# Patient Record
Sex: FEMALE | Race: OTHER | Employment: OTHER | URBAN - METROPOLITAN AREA
[De-identification: names, ages, dates, MRNs, and addresses within clinical notes are randomized per-mention and may not be internally consistent; named-entity substitution may affect disease eponyms.]

---

## 2017-07-10 ENCOUNTER — OFFICE VISIT (OUTPATIENT)
Dept: FAMILY MEDICINE CLINIC | Age: 57
End: 2017-07-10

## 2017-07-10 VITALS
RESPIRATION RATE: 18 BRPM | HEART RATE: 65 BPM | DIASTOLIC BLOOD PRESSURE: 73 MMHG | BODY MASS INDEX: 23.95 KG/M2 | SYSTOLIC BLOOD PRESSURE: 110 MMHG | WEIGHT: 158 LBS | HEIGHT: 68 IN | TEMPERATURE: 97.7 F | OXYGEN SATURATION: 97 %

## 2017-07-10 DIAGNOSIS — R23.3 EASY BRUISING: Primary | ICD-10-CM

## 2017-07-10 RX ORDER — ESCITALOPRAM OXALATE 20 MG/1
20 TABLET ORAL DAILY
COMMUNITY

## 2017-07-10 RX ORDER — LEVOTHYROXINE SODIUM 100 UG/1
TABLET ORAL
COMMUNITY

## 2017-07-10 NOTE — PROGRESS NOTES
Subjective  Meet Anders is an 61 y.o. female who presents to [facility] care. She is here transferring from [address] [facility]. Live sin [facility] now. No significant medical history: 3 miscarriages, hypothyroidism (on synthroid). Surg hx: LTCS, tonsillectomy, appendicitis, right knee meniscal tear repair  Soc hx: rides bikes, takes walks, not currently working. She notices that she is bruising easily. She got some bruises while moving boxes about 3 weeks ago. She has been taking some motrin for muscle aches. No blood thinners. She denies gum bleeding, nose bleed, difficulty with clotting after a scrape or cut, no menorrhagia (when she had periods, now postmenopausal). She only noticed bleeding when she started moving and working with boxes. Past Medical History - reviewed:  Past Medical History:   Diagnosis Date    Anxiety     Migraine     Thyroid disease          ROS  CONSTITUTIONAL: no fever  CARDIOVASCULAR: no chest pain      Physical Exam  Visit Vitals    /73    Pulse 65    Temp 97.7 °F (36.5 °C) (Oral)    Resp 18    Ht 5' 8\" (1.727 m)    Wt 158 lb (71.7 kg)    LMP 07/10/2010    SpO2 97%    BMI 24.02 kg/m2       General appearance - alert, well appearing, and in no distress  Chest - clear to auscultation, no wheezes or rhonchi, symmetric air entry  Heart - normal rate, regular rhythm, normal S1, S2, no murmurs  Abdomen - soft, nontender, nondistended  Extremities - no pedal edema  Skin - normal coloration and turgor, one dime sized bruise present near umbilicus, some healed bruises present on arms. Assessment/Plan    ICD-10-CM ICD-9-CM    1. Easy bruising R23.8 782.9      Bruising appears to be age related. One dime sized bruise present on abdomen. Recent CBC in April 2017 by PCP, within normal limits per patient. No red flag symptoms at this time.  Additionally, patient was moving boxes while relocating.  - Continue to monitor symptoms  - Counseled about preventive care: pap, mammogram, colonoscopy    Follow-up Disposition:  Return if symptoms worsen or fail to improve. I have discussed the diagnosis with the patient and the intended plan as seen in the above orders. The patient has received an after-visit summary and questions were answered concerning future plans. I have discussed medication side effects and warnings with the patient as well.       Hilario Fraire MD  Family Medicine Resident

## 2017-07-10 NOTE — MR AVS SNAPSHOT
Visit Information Date & Time Provider Department Dept. Phone Encounter #  
 7/10/2017  8:35 AM Ning Hebert, [address] [facility] [phone] [id number] Follow-up Instructions Return if symptoms worsen or fail to improve. Upcoming Health Maintenance Date Due Hepatitis C Screening 1960 DTaP/Tdap/Td series (1 - Tdap) 4/18/1980 PAP AKA CERVICAL CYTOLOGY 4/18/1980 BREAST CANCER SCRN MAMMOGRAM 4/18/2009 FOBT Q 1 YEAR AGE 50-75 4/18/2009 INFLUENZA AGE 9 TO ADULT 8/1/2017 Allergies as of 7/10/2017  Review Complete On: 7/10/2017 By: Roxanne Minor MD  
  
 Severity Noted Reaction Type Reactions Pcn [Penicillins]  07/10/2017    Hives Current Immunizations  Never Reviewed No immunizations on file. Not reviewed this visit You Were Diagnosed With   
  
 Codes Comments Easy bruising    -  Primary ICD-10-CM: R23.8 ICD-9-CM: 199. 9 Vitals BP Pulse Temp Resp Height(growth percentile) Weight(growth percentile) 110/73 65 97.7 °F (36.5 °C) (Oral) 18 5' 8\" (1.727 m) 158 lb (71.7 kg) LMP SpO2 BMI OB Status Smoking Status 07/10/2010 97% 24.02 kg/m2 Postmenopausal Never Smoker BMI and BSA Data Body Mass Index Body Surface Area 24.02 kg/m 2 1.85 m 2 Preferred Pharmacy Pharmacy Name Phone [facility] [address] [place] [address] [facility]  [facility] [phone] Your Updated Medication List  
  
   
This list is accurate as of: 7/10/17  9:08 AM.  Always use your most recent med list.  
  
  
  
  
 LEXAPRO 20 mg tablet Generic drug:  escitalopram oxalate Take 20 mg by mouth daily. SYNTHROID 100 mcg tablet Generic drug:  levothyroxine Take  by mouth Daily (before breakfast). VALACYCLOVIR PO Take  by mouth. Follow-up Instructions Return if symptoms worsen or fail to improve. Patient Instructions Well Visit, Women 48 to 72: Care Instructions Your Care Instructions Physical exams can help you stay healthy. Your doctor has checked your overall health and may have suggested ways to take good care of yourself. He or she also may have recommended tests. At home, you can help prevent illness with healthy eating, regular exercise, and other steps. Follow-up care is a key part of your treatment and safety. Be sure to make and go to all appointments, and call your doctor if you are having problems. It's also a good idea to know your test results and keep a list of the medicines you take. How can you care for yourself at home? · Reach and stay at a healthy weight. This will lower your risk for many problems, such as obesity, diabetes, heart disease, and high blood pressure. · Get at least 30 minutes of exercise on most days of the week. Walking is a good choice. You also may want to do other activities, such as running, swimming, cycling, or playing tennis or team sports. · Do not smoke. Smoking can make health problems worse. If you need help quitting, talk to your doctor about stop-smoking programs and medicines. These can increase your chances of quitting for good. · Protect your skin from too much sun. When you're outdoors from 10 a.m. to 4 p.m., stay in the shade or cover up with clothing and a hat with a wide brim. Wear sunglasses that block UV rays. Even when it's cloudy, put broad-spectrum sunscreen (SPF 30 or higher) on any exposed skin. · See a dentist one or two times a year for checkups and to have your teeth cleaned. · Wear a seat belt in the car. · Limit alcohol to 1 drink a day. Too much alcohol can cause health problems. Follow your doctor's advice about when to have certain tests. These tests can spot problems early. · Cholesterol.  Your doctor will tell you how often to have this done based on your age, family history, or other things that can increase your risk for heart attack and stroke. · Blood pressure. Have your blood pressure checked during a routine doctor visit. Your doctor will tell you how often to check your blood pressure based on your age, your blood pressure results, and other factors. · Mammogram. Ask your doctor how often you should have a mammogram, which is an X-ray of your breasts. A mammogram can spot breast cancer before it can be felt and when it is easiest to treat. · Pap test and pelvic exam. Ask your doctor how often you should have a Pap test. You may not need to have a Pap test as often as you used to. · Vision. Have your eyes checked every year or two or as often as your doctor suggests. Some experts recommend that you have yearly exams for glaucoma and other age-related eye problems starting at age 48. · Hearing. Tell your doctor if you notice any change in your hearing. You can have tests to find out how well you hear. · Diabetes. Ask your doctor whether you should have tests for diabetes. · Colon cancer. You should begin tests for colon cancer at age 48. You may have one of several tests. Your doctor will tell you how often to have tests based on your age and risk. Risks include whether you already had a precancerous polyp removed from your colon or whether your parents, sisters and brothers, or children have had colon cancer. · Thyroid disease. Talk to your doctor about whether to have your thyroid checked as part of a regular physical exam. Women have an increased chance of a thyroid problem. · Osteoporosis. You should begin tests for bone density at age 72. If you are younger than 72, ask your doctor whether you have factors that may increase your risk for this disease. You may want to have this test before age 72. · Heart attack and stroke risk. At least every 4 to 6 years, you should have your risk for heart attack and stroke assessed.  Your doctor uses factors such as your age, blood pressure, cholesterol, and whether you smoke or have diabetes to show what your risk for a heart attack or stroke is over the next 10 years. When should you call for help? Watch closely for changes in your health, and be sure to contact your doctor if you have any problems or symptoms that concern you. Where can you learn more? Go to http://nely-dylan.info/. Enter C031 in the search box to learn more about \"Well Visit, Women 50 to 72: Care Instructions. \" Current as of: July 19, 2016 Content Version: 11.3 © 6386-5265 [employer]. Care instructions adapted under license by [employer] (which disclaims liability or warranty for this information). If you have questions about a medical condition or this instruction, always ask your healthcare professional. [address] any warranty or liability for your use of this information. Introducing [facility]! Vesna Chen introduces [employer] patient portal. Now you can access parts of your medical record, email your doctor's office, and request medication refills online. 1. In your internet browser, go to [email or web address] 2. Click on the First Time User? Click Here link in the Sign In box. You will see the New Member Sign Up page. 3. Enter your [employer] Access Code exactly as it appears below. You will not need to use this code after youve completed the sign-up process. If you do not sign up before the expiration date, you must request a new code. · [employer] Access Code: [email or web address] Expires: 10/8/2017  9:08 AM 
 
4. Enter the last four digits of your Social Security Number (xxxx) and Date of Birth (mm/dd/yyyy) as indicated and click Submit. You will be taken to the next sign-up page. 5. Create a [employer] ID. This will be your [employer] login ID and cannot be changed, so think of one that is secure and easy to remember. 6. Create a [employer] password. You can change your password at any time. 7. Enter your Password Reset Question and Answer. This can be used at a later time if you forget your password. 8. Enter your e-mail address. You will receive e-mail notification when new information is available in [address] 9. Click Sign Up. You can now view and download portions of your medical record. 10. Click the Download Summary menu link to download a portable copy of your medical information. If you have questions, please visit the Frequently Asked Questions section of the [employer] website. Remember, [employer] is NOT to be used for urgent needs. For medical emergencies, dial 911. Now available from your iPhone and Android! Please provide this summary of care documentation to your next provider. Your primary care clinician is listed as Ning Osullivan. If you have any questions after today's visit, please call [phone].

## 2017-07-10 NOTE — PATIENT INSTRUCTIONS
Well Visit, Women 48 to 72: Care Instructions  Your Care Instructions  Physical exams can help you stay healthy. Your doctor has checked your overall health and may have suggested ways to take good care of yourself. He or she also may have recommended tests. At home, you can help prevent illness with healthy eating, regular exercise, and other steps. Follow-up care is a key part of your treatment and safety. Be sure to make and go to all appointments, and call your doctor if you are having problems. It's also a good idea to know your test results and keep a list of the medicines you take. How can you care for yourself at home? · Reach and stay at a healthy weight. This will lower your risk for many problems, such as obesity, diabetes, heart disease, and high blood pressure. · Get at least 30 minutes of exercise on most days of the week. Walking is a good choice. You also may want to do other activities, such as running, swimming, cycling, or playing tennis or team sports. · Do not smoke. Smoking can make health problems worse. If you need help quitting, talk to your doctor about stop-smoking programs and medicines. These can increase your chances of quitting for good. · Protect your skin from too much sun. When you're outdoors from 10 a.m. to 4 p.m., stay in the shade or cover up with clothing and a hat with a wide brim. Wear sunglasses that block UV rays. Even when it's cloudy, put broad-spectrum sunscreen (SPF 30 or higher) on any exposed skin. · See a dentist one or two times a year for checkups and to have your teeth cleaned. · Wear a seat belt in the car. · Limit alcohol to 1 drink a day. Too much alcohol can cause health problems. Follow your doctor's advice about when to have certain tests. These tests can spot problems early. · Cholesterol.  Your doctor will tell you how often to have this done based on your age, family history, or other things that can increase your risk for heart attack and stroke. · Blood pressure. Have your blood pressure checked during a routine doctor visit. Your doctor will tell you how often to check your blood pressure based on your age, your blood pressure results, and other factors. · Mammogram. Ask your doctor how often you should have a mammogram, which is an X-ray of your breasts. A mammogram can spot breast cancer before it can be felt and when it is easiest to treat. · Pap test and pelvic exam. Ask your doctor how often you should have a Pap test. You may not need to have a Pap test as often as you used to. · Vision. Have your eyes checked every year or two or as often as your doctor suggests. Some experts recommend that you have yearly exams for glaucoma and other age-related eye problems starting at age 48. · Hearing. Tell your doctor if you notice any change in your hearing. You can have tests to find out how well you hear. · Diabetes. Ask your doctor whether you should have tests for diabetes. · Colon cancer. You should begin tests for colon cancer at age 48. You may have one of several tests. Your doctor will tell you how often to have tests based on your age and risk. Risks include whether you already had a precancerous polyp removed from your colon or whether your parents, sisters and brothers, or children have had colon cancer. · Thyroid disease. Talk to your doctor about whether to have your thyroid checked as part of a regular physical exam. Women have an increased chance of a thyroid problem. · Osteoporosis. You should begin tests for bone density at age 72. If you are younger than 72, ask your doctor whether you have factors that may increase your risk for this disease. You may want to have this test before age 72. · Heart attack and stroke risk. At least every 4 to 6 years, you should have your risk for heart attack and stroke assessed.  Your doctor uses factors such as your age, blood pressure, cholesterol, and whether you smoke or have diabetes to show what your risk for a heart attack or stroke is over the next 10 years. When should you call for help? Watch closely for changes in your health, and be sure to contact your doctor if you have any problems or symptoms that concern you. Where can you learn more? Go to http://nely-dylan.info/. Enter I230 in the search box to learn more about \"Well Visit, Women 50 to 72: Care Instructions. \"  Current as of: July 19, 2016  Content Version: 11.3  © 7615-8755 [employer]. Care instructions adapted under license by [employer] (which disclaims liability or warranty for this information). If you have questions about a medical condition or this instruction, always ask your healthcare professional. [address] any warranty or liability for your use of this information.

## 2017-07-10 NOTE — PROGRESS NOTES
Chief Complaint   Patient presents with    Bleeding/Bruising     thinks she is bruising more easily

## 2017-07-13 DIAGNOSIS — Z12.39 BREAST CANCER SCREENING: Primary | ICD-10-CM

## 2017-08-08 ENCOUNTER — TELEPHONE (OUTPATIENT)
Dept: FAMILY MEDICINE CLINIC | Age: 57
End: 2017-08-08

## 2019-04-29 ENCOUNTER — HOSPITAL ENCOUNTER (OUTPATIENT)
Dept: GENERAL RADIOLOGY | Age: 59
Discharge: HOME | End: 2019-04-29
Payer: COMMERCIAL

## 2019-04-29 DIAGNOSIS — M79.672 LEFT FOOT PAIN: ICD-10-CM

## 2019-04-29 DIAGNOSIS — M25.561 RIGHT KNEE PAIN: ICD-10-CM

## 2019-04-29 PROCEDURE — 73630 X-RAY EXAM OF FOOT: CPT

## 2019-04-29 PROCEDURE — 73562 X-RAY EXAM OF KNEE 3: CPT

## 2020-11-18 ENCOUNTER — HOSPITAL ENCOUNTER (EMERGENCY)
Age: 60
Discharge: HOME | End: 2020-11-18
Attending: EMERGENCY MEDICINE
Payer: COMMERCIAL

## 2020-11-18 ENCOUNTER — APPOINTMENT (OUTPATIENT)
Dept: GENERAL RADIOLOGY | Age: 60
End: 2020-11-18
Attending: PHYSICIAN ASSISTANT
Payer: COMMERCIAL

## 2020-11-18 VITALS
SYSTOLIC BLOOD PRESSURE: 104 MMHG | DIASTOLIC BLOOD PRESSURE: 66 MMHG | TEMPERATURE: 98.5 F | BODY MASS INDEX: 24.25 KG/M2 | RESPIRATION RATE: 18 BRPM | HEIGHT: 68 IN | WEIGHT: 160 LBS | OXYGEN SATURATION: 96 % | HEART RATE: 75 BPM

## 2020-11-18 DIAGNOSIS — M79.675 GREAT TOE PAIN, LEFT: Primary | ICD-10-CM

## 2020-11-18 PROCEDURE — 99283 EMERGENCY DEPT VISIT LOW MDM: CPT

## 2020-11-18 PROCEDURE — 74011250637 HC RX REV CODE- 250/637: Performed by: PHYSICIAN ASSISTANT

## 2020-11-18 PROCEDURE — 73660 X-RAY EXAM OF TOE(S): CPT

## 2020-11-18 RX ORDER — NAPROXEN 500 MG/1
500 TABLET ORAL 2 TIMES DAILY WITH MEALS
Qty: 20 TAB | Refills: 0 | Status: SHIPPED | OUTPATIENT
Start: 2020-11-18 | End: 2020-11-28

## 2020-11-18 RX ORDER — NAPROXEN 500 MG/1
500 TABLET ORAL 2 TIMES DAILY WITH MEALS
Qty: 20 TAB | Refills: 0 | Status: SHIPPED | OUTPATIENT
Start: 2020-11-18 | End: 2020-11-18 | Stop reason: SDUPTHER

## 2020-11-18 RX ORDER — IBUPROFEN 600 MG/1
600 TABLET ORAL
Status: COMPLETED | OUTPATIENT
Start: 2020-11-18 | End: 2020-11-18

## 2020-11-18 RX ADMIN — IBUPROFEN 600 MG: 600 TABLET, FILM COATED ORAL at 12:13

## 2020-11-18 NOTE — ED TRIAGE NOTES
Pt here for pain to base of left great toe. Pt states over weekend she dropped phone onto space between 1st and 2nd toes and has been having pain with ambulation.  Swelling noted to both toes as well as top of foot

## 2020-11-19 NOTE — ED PROVIDER NOTES
Date of Service:  2020    Patient:  Faustino Parr    Chief Complaint:  Toe Pain       HPI:  Faustino Parr is a 63 y.o.  female who presents for evaluation of left toe pain x 3-4 days after dropping iphone on foot. Pain is constant, worse with weight bearing, able to ambulate. No medications taken so far. No additional concerns or complaints. No fever/chills, chest pain, sob, n/v/d, abdominal pain, numbness/tingling/weakness.             Past Medical History:   Diagnosis Date    Anxiety     Migraine     Thyroid disease        Past Surgical History:   Procedure Laterality Date    HX APPENDECTOMY      HX  SECTION      HX KNEE ARTHROSCOPY Right     HX TONSILLECTOMY           Family History:   Problem Relation Age of Onset    Heart Disease Father     Stroke Father        Social History     Socioeconomic History    Marital status:      Spouse name: Not on file    Number of children: Not on file    Years of education: Not on file    Highest education level: Not on file   Occupational History    Not on file   Social Needs    Financial resource strain: Not on file    Food insecurity     Worry: Not on file     Inability: Not on file    Transportation needs     Medical: Not on file     Non-medical: Not on file   Tobacco Use    Smoking status: Never Smoker    Smokeless tobacco: Never Used   Substance and Sexual Activity    Alcohol use: Yes     Comment: socially    Drug use: No    Sexual activity: Never   Lifestyle    Physical activity     Days per week: Not on file     Minutes per session: Not on file    Stress: Not on file   Relationships    Social connections     Talks on phone: Not on file     Gets together: Not on file     Attends Denominational service: Not on file     Active member of club or organization: Not on file     Attends meetings of clubs or organizations: Not on file     Relationship status: Not on file    Intimate partner violence     Fear of current or ex partner: Not on file     Emotionally abused: Not on file     Physically abused: Not on file     Forced sexual activity: Not on file   Other Topics Concern    Not on file   Social History Narrative    Not on file         ALLERGIES: Pcn [penicillins]    Review of Systems   Constitutional: Negative for chills and fever. HENT: Negative for congestion and sore throat. Eyes: Negative for pain. Respiratory: Negative for cough and shortness of breath. Cardiovascular: Negative for chest pain and palpitations. Gastrointestinal: Negative for abdominal pain, diarrhea, nausea and vomiting. Genitourinary: Negative for dysuria, frequency and urgency. Musculoskeletal: Positive for arthralgias. Negative for back pain and neck pain. Skin: Negative for rash and wound. Neurological: Negative for dizziness, light-headedness and headaches. Vitals:    11/18/20 1128   BP: 104/66   Pulse: 75   Resp: 18   Temp: 98.5 °F (36.9 °C)   SpO2: 96%   Weight: 72.6 kg (160 lb)   Height: 5' 8\" (1.727 m)            Physical Exam  Vitals signs and nursing note reviewed. Constitutional:       Appearance: Normal appearance. HENT:      Head: Normocephalic and atraumatic. Nose: Nose normal.   Eyes:      Extraocular Movements: Extraocular movements intact. Conjunctiva/sclera: Conjunctivae normal.      Pupils: Pupils are equal, round, and reactive to light. Neck:      Musculoskeletal: Normal range of motion and neck supple. Cardiovascular:      Rate and Rhythm: Normal rate and regular rhythm. Pulses: Normal pulses. Radial pulses are 2+ on the right side and 2+ on the left side. Posterior tibial pulses are 2+ on the right side and 2+ on the left side. Heart sounds: Normal heart sounds. Pulmonary:      Effort: Pulmonary effort is normal.      Breath sounds: Normal breath sounds. Abdominal:      General: Abdomen is flat. Bowel sounds are normal.      Palpations: Abdomen is soft.    Musculoskeletal: Normal range of motion. Comments: TTP to base of left great toe   Skin:     General: Skin is warm and dry. Neurological:      General: No focal deficit present. Mental Status: She is alert and oriented to person, place, and time. Mental status is at baseline. Psychiatric:         Mood and Affect: Mood normal.         Behavior: Behavior normal.         Thought Content: Thought content normal.          MDM  Number of Diagnoses or Management Options  Great toe pain, left:   Diagnosis management comments: IMAGING:  XR GREAT TOE LT MIN 2 V   Final Result    IMPRESSION: No fracture is identified. There are degenerative changes left first    MTP joint. .         Medications During Visit:  Medications  ibuprofen (MOTRIN) tablet 600 mg (600 mg Oral Given 11/18/20 1213)      DECISION MAKING:  Chloe Reid is a 63 y.o. female who comes in as above. Left toe pain after dropping iphone on it about 3-4 days ago, able to ambulate but with pain. No medications taken, no additional injuries or concerns. Vitals stable, patient resting comfortably, no acute distress, TTP to base of left great toe. Imaging shows no fracture, degenerative changes noted, all results discussed with patient. Patient toe is zuly taped, placed in post op shoe. Recommend rest, ice, elevation, anti-inflammatories. Follow up with PCP within 1 week. Return to ED if any worsening or severe symptoms. IMPRESSION:  Great toe pain, left  (primary encounter diagnosis)    DISPOSITION:  Discharged      Discharge Medication List as of 11/18/2020  1:01 PM    START taking these medications    naproxen (Naprosyn) 500 mg tablet  Take 1 Tab by mouth two (2) times daily (with meals) for 10 days. , Normal, Disp-20 Tab,R-0      CONTINUE these medications which have NOT CHANGED    escitalopram oxalate (LEXAPRO) 20 mg tablet  Take 20 mg by mouth daily. , Historical Med    levothyroxine (SYNTHROID) 100 mcg tablet  Take  by mouth Daily (before breakfast). , Historical Med    VALACYCLOVIR HCL (VALACYCLOVIR PO)  Take  by mouth., Historical Med           Follow-up Information     Follow up With Specialties Details Why Contact Info    Katie Tillman MD [facility] Medicine Schedule an appointment as soon as   possible for a visit in 1 week  [address] [facility]  [phone]      [facility]  Schedule an appointment as soon as possible for a visit in 1   week  [address] [facility] [address] [address] [place] [address] [phone]    [facility] EMERGENCY DEPT Emergency Medicine Go to  If symptoms worsen [address] [facility]  [phone]          The patient is asked to follow-up with their primary care provider in the next several days. They are to call tomorrow for an appointment. The patient is asked to return promptly for any increased concerns or worsening of symptoms. They can return to this emergency department or any other emergency department.            Procedures

## 2021-11-05 ENCOUNTER — TRANSCRIBE ORDER (OUTPATIENT)
Dept: SCHEDULING | Age: 61
End: 2021-11-05

## 2021-11-05 DIAGNOSIS — E78.5 HYPERLIPIDEMIA: Primary | ICD-10-CM

## 2021-11-09 ENCOUNTER — TRANSCRIBE ORDER (OUTPATIENT)
Dept: SCHEDULING | Age: 61
End: 2021-11-09

## 2021-11-09 DIAGNOSIS — E78.5 HYPERLIPEMIA: Primary | ICD-10-CM

## 2021-11-22 ENCOUNTER — HOSPITAL ENCOUNTER (OUTPATIENT)
Dept: CT IMAGING | Age: 61
Discharge: HOME | End: 2021-11-22
Attending: FAMILY MEDICINE

## 2021-11-22 DIAGNOSIS — E78.5 HYPERLIPEMIA: ICD-10-CM

## 2021-11-22 PROCEDURE — 75571 CT HRT W/O DYE W/CA TEST: CPT

## 2021-11-23 NOTE — CARDIO/PULMONARY
Reached patient at her given mobile number. Patient states she has seen her coronary artery CT score of zero on [employer]. Discussed the meaning of this score. Patient has no further questions at this time.

## 2022-12-28 ENCOUNTER — TELEPHONE (OUTPATIENT)
Dept: INTERNAL MEDICINE CLINIC | Age: 62
End: 2022-12-28

## 2022-12-28 NOTE — TELEPHONE ENCOUNTER
----- Message from Raeann Swanson sent at 12/28/2022 11:49 AM EST -----  Subject: Message to Provider    QUESTIONS  Information for Provider? Lindi Goltz called in regarding her phone   number being connected to one of your patient charts, [employer]. She stated she has been trying for a year to have this removed. She has been receiving appointment information for that patient. She is   requesting her number be removed from the chart, [phone]. [facility] was   unable to transfer. Thank you.  ---------------------------------------------------------------------------  --------------  Danuta TURNER  [phone]; OK to leave message on voicemail  ---------------------------------------------------------------------------  --------------  SCRIPT ANSWERS  Relationship to Patient?  Self

## 2022-12-28 NOTE — TELEPHONE ENCOUNTER
Pt contact information has been updated and number removed. No further action required.       Saba Mcpherson, ADRIN

## 2023-05-26 RX ORDER — ESCITALOPRAM OXALATE 20 MG/1
20 TABLET ORAL DAILY
COMMUNITY

## 2023-05-26 RX ORDER — LEVOTHYROXINE SODIUM 0.1 MG/1
TABLET ORAL
COMMUNITY